# Patient Record
Sex: FEMALE | Race: WHITE
[De-identification: names, ages, dates, MRNs, and addresses within clinical notes are randomized per-mention and may not be internally consistent; named-entity substitution may affect disease eponyms.]

---

## 2020-07-02 ENCOUNTER — HOSPITAL ENCOUNTER (EMERGENCY)
Dept: HOSPITAL 11 - JP.ED | Age: 70
Discharge: LEFT BEFORE DISCHARGE | End: 2020-07-02
Payer: MEDICARE

## 2020-07-02 DIAGNOSIS — Z53.21: Primary | ICD-10-CM

## 2022-01-03 ENCOUNTER — APPOINTMENT (OUTPATIENT)
Dept: URBAN - METROPOLITAN AREA CLINIC 259 | Age: 72
Setting detail: DERMATOLOGY
End: 2022-01-03

## 2022-01-03 DIAGNOSIS — B07.8 OTHER VIRAL WARTS: ICD-10-CM

## 2022-01-03 DIAGNOSIS — R20.8 OTHER DISTURBANCES OF SKIN SENSATION: ICD-10-CM

## 2022-01-03 PROCEDURE — 99202 OFFICE O/P NEW SF 15 MIN: CPT

## 2022-01-03 PROCEDURE — OTHER COUNSELING: OTHER

## 2022-01-03 PROCEDURE — OTHER MIPS QUALITY: OTHER

## 2022-01-03 ASSESSMENT — LOCATION DETAILED DESCRIPTION DERM
LOCATION DETAILED: LEFT MEDIAL PLANTAR MIDFOOT
LOCATION DETAILED: LEFT PLANTAR FOREFOOT OVERLYING 1ST METATARSAL

## 2022-01-03 ASSESSMENT — LOCATION SIMPLE DESCRIPTION DERM: LOCATION SIMPLE: LEFT PLANTAR SURFACE

## 2022-01-03 ASSESSMENT — LOCATION ZONE DERM: LOCATION ZONE: FEET

## 2022-01-03 NOTE — PROCEDURE: COUNSELING
Detail Level: Zone
Patient Specific Counseling (Will Not Stick From Patient To Patient): Discussed treatment options including Triple Threat therapy, excision or Montemayor Wart Machine with Barrett Mejia in [place].

## 2022-03-07 ENCOUNTER — APPOINTMENT (OUTPATIENT)
Dept: URBAN - METROPOLITAN AREA CLINIC 254 | Age: 72
Setting detail: DERMATOLOGY
End: 2022-03-07

## 2022-03-07 DIAGNOSIS — B07.8 OTHER VIRAL WARTS: ICD-10-CM

## 2022-03-07 PROCEDURE — OTHER COUNSELING: OTHER

## 2022-03-07 PROCEDURE — OTHER ADDITIONAL NOTES: OTHER

## 2022-03-07 PROCEDURE — OTHER SWIFT MICROWAVE THERAPY: OTHER

## 2022-03-07 ASSESSMENT — LOCATION SIMPLE DESCRIPTION DERM: LOCATION SIMPLE: LEFT PLANTAR SURFACE

## 2022-03-07 ASSESSMENT — LOCATION ZONE DERM: LOCATION ZONE: FEET

## 2022-03-07 ASSESSMENT — LOCATION DETAILED DESCRIPTION DERM
LOCATION DETAILED: LEFT PLANTAR FOREFOOT OVERLYING 2ND METATARSAL
LOCATION DETAILED: LEFT PLANTAR FOREFOOT OVERLYING 1ST METATARSAL

## 2022-03-07 NOTE — PROCEDURE: SWIFT MICROWAVE THERAPY
Price (Use Numbers Only, No Special Characters Or $): 309 Price (Use Numbers Only, No Special Characters Or $): 689

## 2022-03-07 NOTE — PROCEDURE: COUNSELING
Detail Level: Simple
Patient Specific Counseling (Will Not Stick From Patient To Patient): - Discussed the available treatment options in detail including pros and cons of each including differences in procedural pain, post-procedure pain, wound care, scar, postinflammatory pigmentation, infection, recurrence rates, and cost.\\n- specifically, we discussed bleo/cryo, Canthacur/cryosurgery, and salicylic acid70%/efudex

## 2022-03-07 NOTE — PROCEDURE: ADDITIONAL NOTES
Render Risk Assessment In Note?: no
Additional Notes: Had an in-depth conversation regarding treatments that patient has had in the past along with possible future treatment options to consider regarding the warts. We discussed Persaud, Bleomycin, cantharidin and LN2, as well as compounded topical cream.\\n\\nPatient decided to go with the persaud treatment.
Detail Level: Detailed

## 2022-03-07 NOTE — HPI: WARTS (VERRUCA)
Is This A New Presentation, Or A Follow-Up?: Warts
Additional History: Seen for warts and treated multiple times in past. Painful to walk on.  Wants to discuss persaud but she was not aware this persaud was not billable to insurance. Used imiquimod for a year and it did not help. Has also bee treated with homeopathic options that did not help. Also treated with 5-6 sessions of freezing. Has used salicylic acid in past that helped a little.

## 2022-03-07 NOTE — PROCEDURE: SWIFT MICROWAVE THERAPY
Consent: Total number of pulses per treatment site: 5\\n\\n- Discussed the Montemayor microwave immunotherapy option in detail.\\n- Discussed mechanism of action, rates of wart clearance, and the expectation of brief periods of moderate discomfort during the tail end of treatment pulses. \\n- Advised that risk of post-procedure pain is less than 1%.\\n- Discussed that this treatment, since it is not a destruction method, is not billable to insurance and therefore an out-of-pocket expense that is due the same day the procedure is performed. Advised that the cost is per treatment session. \\n- Discussed that treatments are spaced every 4-5 weeks, and generally 3-4 treatment sessions may be necessary.\\n- Advised that due to the unpredictable nature of warts, results can never be guaranteed and there is a chance that they do not have resolution of warts with treatment. Patient/guardian expressed understanding.\\n- Discussed that due to the mechanism of action, it is normal and expected that they may not have a visible response to treatment until up to 3 months after treatment sessions are completed. \\n- Generally we will treat 3-4 sessions and then plan to follow-up 12 weeks after final treatment if any warts are not resolved. At that time, if improvement is appreciated, an additional treatment may be warranted or if no improvement we may need to consider an alternative treatment option.  \\n- Patient/guardian expressed understanding, provided informed consent for treatment, and requested treatment today.

## 2022-04-04 ENCOUNTER — APPOINTMENT (OUTPATIENT)
Dept: URBAN - METROPOLITAN AREA CLINIC 254 | Age: 72
Setting detail: DERMATOLOGY
End: 2022-04-04

## 2022-04-04 VITALS — RESPIRATION RATE: 12 BRPM | WEIGHT: 132 LBS | HEIGHT: 66 IN

## 2022-04-04 DIAGNOSIS — B07.8 OTHER VIRAL WARTS: ICD-10-CM

## 2022-04-04 PROCEDURE — OTHER SWIFT MICROWAVE THERAPY: OTHER

## 2022-04-04 PROCEDURE — OTHER MIPS QUALITY: OTHER

## 2022-04-04 PROCEDURE — OTHER COUNSELING: OTHER

## 2022-04-04 ASSESSMENT — LOCATION SIMPLE DESCRIPTION DERM: LOCATION SIMPLE: LEFT PLANTAR SURFACE

## 2022-04-04 ASSESSMENT — LOCATION ZONE DERM: LOCATION ZONE: FEET

## 2022-04-04 NOTE — HPI: WARTS (VERRUCA)
Is This A New Presentation, Or A Follow-Up?: Warts
Additional History: Here for second persaud. No post procedure pain but she would like to try try injecting anesthetic.

## 2022-04-04 NOTE — PROCEDURE: SWIFT MICROWAVE THERAPY
Price (Use Numbers Only, No Special Characters Or $): 878 Price (Use Numbers Only, No Special Characters Or $): 884

## 2022-04-04 NOTE — PROCEDURE: SWIFT MICROWAVE THERAPY
Consent: Total number of pulses per treatment site: 5\\n\\n- Reviewed the Montemayor microwave immunotherapy option in detail.\\n- Reviewed mechanism of action, rates of wart clearance, and the expectation of brief periods of moderate discomfort during the tail end of treatment pulses. \\n- Discussed the expectation of a brief severe pain that accompanies the pulses and advised the pain generally increased with each pulse. Wattage can be adjusted to tolerance. \\n- It is normal and expected to have some residual low level pain or sensitivity for the next several hours following treatment. \\n- Advised of the risk of post-procedure pain in the days following the procedure in a vast minority of individuals and that the degree of pain can be unpredictable. It is important to let us know if this pain is experienced so we can use lower power at any subsequent treatments. Patent expressed understanding. \\n- Reviewed that this treatment, since it is not a destruction method, is not billable to insurance and therefore an out-of-pocket expense that is due the same day the procedure is performed. Advised that the cost is per treatment session. \\n- Reviewed that treatments are spaced every 4-5 weeks, and generally 3-4 treatment sessions may be necessary.\\n- Advised that due to the unpredictable nature of warts, results can never be guaranteed and there is a chance that they do not have resolution of warts with treatment. Furthermore, no refund can be given for non-resolution of lesions. Patient/guardian expressed understanding.\\n- Reviewed that due to the mechanism of action, it is normal and expected that they may not have a visible response to treatment until up to 3 months after treatment sessions are completed. \\n- Generally we will treat 3-4 sessions and then plan to follow-up 12 weeks after final treatment if any warts are not resolved. At that time, if improvement is appreciated, an additional treatment may be warranted or if no improvement we may need to consider an alternative treatment option.  \\n- Patient/guardian expressed understanding, provided informed consent for treatment, and requested treatment today.

## 2022-05-02 ENCOUNTER — APPOINTMENT (OUTPATIENT)
Dept: URBAN - METROPOLITAN AREA CLINIC 254 | Age: 72
Setting detail: DERMATOLOGY
End: 2022-05-02

## 2022-05-02 DIAGNOSIS — B07.8 OTHER VIRAL WARTS: ICD-10-CM

## 2022-05-02 PROCEDURE — OTHER COUNSELING: OTHER

## 2022-05-02 PROCEDURE — OTHER SWIFT MICROWAVE THERAPY: OTHER

## 2022-05-02 PROCEDURE — OTHER MIPS QUALITY: OTHER

## 2022-05-02 ASSESSMENT — LOCATION SIMPLE DESCRIPTION DERM: LOCATION SIMPLE: LEFT PLANTAR SURFACE

## 2022-05-02 ASSESSMENT — LOCATION ZONE DERM: LOCATION ZONE: FEET

## 2022-05-02 NOTE — PROCEDURE: SWIFT MICROWAVE THERAPY
Price (Use Numbers Only, No Special Characters Or $): 928 Price (Use Numbers Only, No Special Characters Or $): 627

## 2022-05-02 NOTE — PROCEDURE: SWIFT MICROWAVE THERAPY
Consent: Total number of pulses per treatment site: 6\\n\\n- Reviewed the Montemayor microwave immunotherapy option in detail.\\n- Reviewed mechanism of action, rates of wart clearance, and the expectation of brief periods of moderate discomfort during the tail end of treatment pulses. \\n- Advised of the risk of post-procedure pain in some individuals and that the degree of pain is is unpredictable\\n- Reviewed that this treatment, since it is not a destruction method, is not billable to insurance and therefore an out-of-pocket expense that is due the same day the procedure is performed. Advised that the cost is per treatment session. \\n- Advised that due to the unpredictable nature of warts, results can never be guaranteed and there is a chance that they do not have resolution of warts with treatment. Furthermore, no refund can be given for non-resolution of lesions. Patient/guardian expressed understanding.\\n- Reviewed that due to the mechanism of action, it is normal and expected that they may not have a visible response to treatment until up to 3 months after treatment sessions are completed. \\n- If improvement is appreciated, an additional treatment may be warranted or if no improvement we may need to consider an alternative treatment option.  \\n- Patient/guardian expressed understanding, provided informed consent for treatment, and requested treatment today.\\n- Recommended scheduling a 3-month follow up appointment. 3-5 days before the appointment, examine the warts and cancel if obviously resolved. If warts are still present or if uncertain then keep appointment.

## 2022-05-02 NOTE — HPI: WARTS (VERRUCA)
Is This A New Presentation, Or A Follow-Up?: Warts
Additional History: Here for third persaud treatment. Got blood blister after last office visit but minimal discomfort.

## 2023-10-03 NOTE — HPI: WARTS (VERRUCA)
How Severe Are Your Warts?: moderate
I s/w RA[facility] about pt's request for Sildenafil 100 mg since it was not discussed at the [address] [place] [address] in April and she reviewed the pt's chart and [facility]'s LOV note on pt and she agreed that it was ok to send a script for a 1 yr supply to pt's pharmacy.
Is This A New Presentation, Or A Follow-Up?: Warts
Additional History: The patient [place] [facility] [place] was referred to our clinic for further evaluation and management.